# Patient Record
Sex: MALE | Race: ASIAN | URBAN - METROPOLITAN AREA
[De-identification: names, ages, dates, MRNs, and addresses within clinical notes are randomized per-mention and may not be internally consistent; named-entity substitution may affect disease eponyms.]

---

## 2017-01-08 DIAGNOSIS — Z02.89 HEALTH EXAMINATION OF DEFINED SUBPOPULATION: ICD-10-CM

## 2017-01-08 NOTE — Clinical Note
February 6, 2017      Marcbrooklynn López Paw  [address]   [place] [address]        Dear Marc,    The results of your stool cultures came back and showed that you have some overgrowth of bacteria. We typically do not treat this but would consider more testing in the future if you begin to have symptoms of; diarrhea, blood or mucus in your stools, or abdominal pain. If you have questions please contact clinic at [phone]. Thank you for allowing us to participate in your care.    Please see below for your test results.    Resulted Orders   Ova And Parasite - Stool ([facility])   Result Value Ref Range    Ova and Parasite Entamoeba coli Cysts (A) No Ova or Parasites seen    Ova + Parasite Additional 1 Endolimax ken cysts (A) No Ova or Parasites seen    Narrative    Test performed by:  [facility]  [address] [place] [address]       If you have any questions, please call the clinic to make an appointment.    Sincerely,    Dr. Sands

## 2017-01-09 ENCOUNTER — OFFICE VISIT (OUTPATIENT)
Dept: FAMILY MEDICINE | Facility: CLINIC | Age: 27
End: 2017-01-09

## 2017-01-09 VITALS
HEART RATE: 90 BPM | SYSTOLIC BLOOD PRESSURE: 124 MMHG | WEIGHT: 109.8 LBS | TEMPERATURE: 97.5 F | BODY MASS INDEX: 18.29 KG/M2 | HEIGHT: 65 IN | DIASTOLIC BLOOD PRESSURE: 79 MMHG

## 2017-01-09 DIAGNOSIS — Z23 NEED FOR VACCINATION: ICD-10-CM

## 2017-01-09 DIAGNOSIS — Z02.89 HEALTH EXAMINATION OF DEFINED SUBPOPULATION: ICD-10-CM

## 2017-01-09 DIAGNOSIS — B65.9 SCHISTOSOMA: Primary | ICD-10-CM

## 2017-01-09 PROBLEM — B18.1 HEPATITIS B CARRIER (H): Status: ACTIVE | Noted: 2017-01-09

## 2017-01-09 RX ORDER — PRAZIQUANTEL 600 MG/1
1000 TABLET, FILM COATED ORAL 3 TIMES DAILY
Qty: 5 TABLET | Refills: 0 | Status: SHIPPED | OUTPATIENT
Start: 2017-01-09 | End: 2017-01-10

## 2017-01-09 NOTE — PATIENT INSTRUCTIONS
Stop by the  for referral to [facility] for TB referral     Please schedule an appointment for a dentist to look at your mouth an area of growth along the upper left molars    Praziquantel 900 mg three times per day for 1 day     Thank you for coming to [facility].  **If you had lab testing today and your results are reassuring or normal they will be be mailed to you within 7 days.   **If the lab tests need quick action we will call you with the results.  The phone number we will call with results is # [phone] (home) . If this is not the best number please call our clinic and change the number.  If you need any refills please call your pharmacy and they will contact us.  If you have any concerns about today's visit or wish to schedule another appointment please call our office during normal business hours [phone] (8-5:00 M-F)  If you have urgent medical concerns please call [phone] at any time of the day.  If you a medical emergency please call [phone]  Again thank you for choosing [facility] and please let us know how we can best partner with you to improve you and your family's health.

## 2017-01-09 NOTE — PROGRESS NOTES
"Preceptor attestation:  Vital signs reviewed: /79 mmHg  Pulse 90  Temp(Src) 97.5  F (36.4  C) (Oral)  Ht 5' 4.5\" (163.8 cm)  Wt 109 lb 12.8 oz (49.805 kg)  BMI 18.56 kg/m2    Patient seen and discussed with the resident.  Assessment and plan reviewed with resident and agreed upon.    Supervising physician: Ana Luisa Ramirez  [facility]  "

## 2017-01-09 NOTE — Clinical Note
February 6, 2017        Marcbrooklynn López Paw  [address]   [place] [address]        Dear Marc,    The results of your stool cultures came back and showed that you have some overgrowth of bacteria. We typically do not treat this but would consider more testing in the future if you begin to have symptoms of diarrhea, blood or mucus in your stools, or abdominal pain. If you have questions please contact clinic at [phone]. Thank you for allowing us to participate in your care.      Please see below for your test results.    Resulted Orders   Ova And Parasite - Stool ([facility])   Result Value Ref Range    Ova and Parasite Entamoeba coli Cysts (A) No Ova or Parasites seen    Ova + Parasite Additional 1 Entamoeba coli trophozoites (A) No Ova or Parasites seen    Narrative    Test performed by:  [facility]  [address] [place] [address]       If you have any questions, please call the clinic to make an appointment.    Sincerely,    Mo Sands, DO

## 2017-01-09 NOTE — MR AVS SNAPSHOT
After Visit Summary   1/9/2017    Marc Maria    MRN: [id number]           Patient Information     Date Of Birth          1990        Visit Information        Provider Department      1/9/2017 8:00 AM Mo Sands DO [facility]        Today's Diagnoses     Schistosoma    -  1       Care Instructions    Stop by the  for referral to [facility] for TB referral     Please schedule an appointment for a dentist to look at your mouth an area of growth along the upper left molars    Praziquantel 900 mg three times per day for 1 day     Thank you for coming to [facility].  **If you had lab testing today and your results are reassuring or normal they will be be mailed to you within 7 days.   **If the lab tests need quick action we will call you with the results.  The phone number we will call with results is # [phone] (home) . If this is not the best number please call our clinic and change the number.  If you need any refills please call your pharmacy and they will contact us.  If you have any concerns about today's visit or wish to schedule another appointment please call our office during normal business hours [phone] (8-5:00 M-F)  If you have urgent medical concerns please call [phone] at any time of the day.  If you a medical emergency please call [phone]  Again thank you for choosing [facility] and please let us know how we can best partner with you to improve you and your family's health.            Follow-ups after your visit        Who to contact     Please call your clinic at [phone] to:    Ask questions about your health    Make or cancel appointments    Discuss your medicines    Learn about your test results    Speak to your doctor   If you have compliments or concerns about an experience at your clinic, or if you wish to file a complaint, please contact [facility] Patient Relations at [phone] or email  "us at [email or web address]         Additional Information About Your Visit        Submittablehart Information     [employer] is an electronic gateway that provides easy, online access to your medical records. With [email or web address], you can request a clinic appointment, read your test results, renew a prescription or communicate with your care team.     To sign up for [email or web address] visit the website at [email or web address]   You will be asked to enter the access code listed below, as well as some personal information. Please follow the directions to create your username and password.     Your access code is: [email or web address]  Expires: 3/26/2017  9:06 AM     Your access code will  in 90 days. If you need help or a new code, please contact your [facility] or call [phone] for assistance.        Care EveryWhere ID     This is your Care EveryWhere ID. This could be used by other organizations to access your [place] medical records  [id number]        Your Vitals Were     Pulse Temperature Height BMI (Body Mass Index)          90 97.5  F (36.4  C) (Oral) 5' 4.5\" (163.8 cm) 18.56 kg/m2         Blood Pressure from Last 3 Encounters:   17 124/79   16 136/83    Weight from Last 3 Encounters:   17 109 lb 12.8 oz (49.805 kg)   16 112 lb 6.4 oz (50.984 kg)              Today, you had the following     No orders found for display         Today's Medication Changes          These changes are accurate as of: 17  8:44 AM.  If you have any questions, ask your nurse or doctor.               Start taking these medicines.        Dose/Directions    praziquantel 600 MG tablet   Commonly known as:  BILTRICIDE   Used for:  Schistosoma   Started by:  Mo Sands DO        Dose:  1000 mg   Take 1.5 tablets (900 mg) by mouth 3 times daily for 1 day   Quantity:  5 tablet   Refills:  0            Where to get your medicines      These medications were sent to [facility] " [facility] [place], [place] - [address]   [address], [place] [address]     Phone:  [phone]    - praziquantel 600 MG tablet             Primary Care Provider Office Phone # Fax #    Mo Sands [phone]       [facility] [address] [address]        Thank you!     Thank you for choosing [facility]  for your care. Our goal is always to provide you with excellent care. Hearing back from our patients is one way we can continue to improve our services. Please take a few minutes to complete the written survey that you may receive in the mail after your visit with us. Thank you!             Your Updated Medication List - Protect others around you: Learn how to safely use, store and throw away your medicines at www.disposemymeds.org.          This list is accurate as of: 1/9/17  8:44 AM.  Always use your most recent med list.                   Brand Name Dispense Instructions for use    praziquantel 600 MG tablet    BILTRICIDE    5 tablet    Take 1.5 tablets (900 mg) by mouth 3 times daily for 1 day

## 2017-01-09 NOTE — PROGRESS NOTES
REFUGEE SCREENING: SECOND VISIT    Subjective:     Presents today in review of his lab tests. He has not concerns or questions today. States that he is otherwise doing well.     Labs from Initial Refugee Screening Visit were reviewed       Office Visit on 12/26/2016   Component Date Value Ref Range Status     Sodium 12/26/2016 140  136 - 145 mmol/L Final     Potassium 12/26/2016 3.8  3.5 - 5.0 mmol/L Final     Chloride 12/26/2016 105  98 - 107 mmol/L Final     CO2, Total 12/26/2016 25  22 - 31 mmol/L Final     Anion Gap 12/26/2016 10  5 - 18 mmol/L Final     Glucose 12/26/2016 82  70 - 125 mg/dL Final     Urea Nitrogen 12/26/2016 12  8 - 22 mg/dL Final     Creatinine 12/26/2016 1.05  0.70 - 1.30 mg/dL Final     GFR Estimate If Black 12/26/2016 >60  >60 mL/min/1.73m2 Final     GFR Estimate 12/26/2016 >60  >60 mL/min/1.73m2 Final     Bilirubin Total 12/26/2016 0.9  0.0 - 1.0 mg/dL Final     Calcium 12/26/2016 9.2  8.5 - 10.5 mg/dL Final     Protein Total 12/26/2016 7.0  6.0 - 8.0 g/dL Final     Albumin 12/26/2016 4.0  3.5 - 5.0 g/dL Final     Alkaline Phosphatase 12/26/2016 57  45 - 120 U/L Final     AST (SGOT) 12/26/2016 20  0 - 40 U/L Final     ALT (SGPT) 12/26/2016 20  0 - 45 U/L Final     Cholesterol 12/26/2016 156  <=199 mg/dL Final     Triglycerides 12/26/2016 140  <=149 mg/dL Final     HDL Cholesterol 12/26/2016 45  >=40 mg/dL Final     LDL Cholesterol Calculated 12/26/2016 83  <=129 mg/dL Final     Fasting? 12/26/2016 Unknown   Final     WBC 12/26/2016 4.5  4.0 - 11.0 thou/uL Final     RBC 12/26/2016 5.41  4.40 - 6.20 mill/uL Final     Hemoglobin 12/26/2016 15.6  14.0 - 18.0 g/dL Final     Hematocrit 12/26/2016 47.2  40.0 - 54.0 % Final     MCV 12/26/2016 87  80 - 100 fL Final     MCH 12/26/2016 28.8  27.0 - 34.0 pg Final     MCHC 12/26/2016 33.1  32.0 - 36.0 g/dL Final     RDW 12/26/2016 12.5  11.0 - 14.5 % Final     Platelets 12/26/2016 129* 140 - 440 thou/uL Final     Mean Platelet Volume 12/26/2016 12.3   8.5 - 12.5 fL Final     % Neutrophils 12/26/2016 59  50 - 70 % Final     % Lymphocytes 12/26/2016 32  20 - 40 % Final     % Monocytes 12/26/2016 6  2 - 10 % Final     % Eosinophils 12/26/2016 3  0 - 6 % Final     % Basophils 12/26/2016 1  0 - 2 % Final     Neutrophils (Absolute) 12/26/2016 2.7  2.0 - 7.7 thou/uL Final     Lymphs (Absolute) 12/26/2016 1.5  0.8 - 4.4 thou/uL Final     Monocytes(Absolute) 12/26/2016 0.3  0.0 - 0.9 thou/uL Final     Eos (Absolute) 12/26/2016 0.1  0.0 - 0.4 thou/uL Final     Baso (Absolute) 12/26/2016 0.0  0.0 - 0.2 thou/uL Final     MTB Quant Result 12/26/2016 Positive* Negative Final     M Tuberculosis Antigen Value 12/26/2016 2.73   Final     MTB Quant Interp 12/26/2016 SEE RESULTS BELOW   Final    Comment: The magnitude of the measured IFN-gamma level          cannot be correlated to stage or degree of infection,  level of immune responsiveness, or likelihood for      progression to active disease.       Scan Result 12/26/2016 See Scanned Report   Final    Comment:     This is a qualitative test. The TB antigen IU/mL          value is required for documentation on certain         government reporting forms but this value should       not be used to monitor disease progression or          response to therapy.                                Diagnosing or excluding tuberculosis disease, and         assessing the probability of LTBI, require a           combination of epidemiological, historical,            medical and diagnostic findings that should be      taken into account when interpreting       QuantiFERON-TB results.                      Performed by: [facility]                 [facility]                       [facility] [id number]                         [address] [place] [id number]        Syphilis Screen Cascade 12/26/2016 Non-Reactive  Non-Reactive Final     Strongyloides Ab, IgG, S 12/26/2016 Negative  Negative  Final    Comment: No detectable levels of IgG antibodies to Strongyloides.  Repeat testing in 1-2 weeks if clinically indicated.     Test Performed by:  [facility] [place] [address]  : Abe Marte II, M.D., Ph.D.       Schistosoma Ab, IgG, S 12/26/2016 Equivocal*  Final    Comment: Recommend follow-up testing in 10-14 days if clinically  indicated.     -------------------ADDITIONAL INFORMATION-------------------  This test has been modified from the 's   instructions. Its performance characteristics were   determined by [facility] in a manner consistent with   CLIA requirements. This test has not been cleared or   approved by the U.S. Food and Drug Administration.     Test Performed by:  [facility] [place] [address]  : Abe Marte II, M.D., Ph.D.       HIV Antigen/Antibody 12/26/2016 Negative  Negative Final     Hepatitis A Antibody, Total 12/26/2016 Positive  Positive Final     Hepatitis B Core Antibody 12/26/2016 Positive* Negative Final     Hepatitis B Surface Antibody 12/26/2016 Positive* Negative Final     Hepatitis C Antibody Screen 12/26/2016 Negative  Negative Final     V.zoster Immune Status 12/26/2016 Immune  Immune Final     Hepatitis B Surface Antigen 12/26/2016 Negative  Negative Final     Chlamydia trac,Amplified Prb 12/26/2016 Negative  Negative Final     N gonorrhoeae,Amplified Prb 12/26/2016 Negative  Negative Final     Vitamin B12 12/26/2016 502  213 - 816 pg/mL Final        ROS:  General: No fevers, sleeping well at night  Head: No headache  Neck: No swallowing problems   CV: No chest pain or palpitations  Resp: No shortness of breath.  No cough.  GI: No constipation, or diarrhea, no nausea or vomiting  : No urinary c/o    Objective:  There were no vitals taken for this visit.  Gen:  Well nourished and  in NAD  HEENT: nasopharynx pink and moist; oropharynx pink and moist. Growth along left upper jawline. Non-painful to exam and no fluctuance.   Neck: supple without lymphadenopathy  CV:  RRR  - no murmurs, rubs, or gallups,   Pulm:  CTAB, no wheezes/rales/rhonchi, good air entry   ABD: soft, nontender  Extrem: no cyanosis, edema or clubbing;   Psych: Euthymic     Assessment/Plan:  There are no diagnoses linked to this encounter.    1)Abnormal Lab Results:  Hepatitis B core Ab positive, Hepatitis B surface Ab positive, Hepatitis B surface Ag negative - Past Hepatitis B infection   Hepatitis A Ab positive - Past Hepatitis A infection   Schistosoma - Equivocal (Will treat today with praziquantel TID for 1 day)      2) TB:   TB Quant result: Positive   History of active TB with [place] refugee [place]. Will send him to be seen by [facility] TB clinic     3)Immunizations:  TDaP  Influenza   Yearly  HPV    4)Referrals:  [facility]    Dentist for growth along left upper jawline.       5)Follow up Plan:   Return to clinic for annual physical in 1 year and check back after being seen by a Dentist.     Patient was seen and discussed with Dr. James Sands

## 2017-01-09 NOTE — NURSING NOTE
Marc Sheh Paw      1.  Has the patient received the information for the influenza vaccine? YES    2.  Does the patient have any of the following contraindications?     Allergy to eggs? No     Allergic reaction to previous influenza vaccines? No     Any other problems to previous influenza vaccines? No     Paralyzed by Guillain-Erwinna syndrome? No     Currently pregnant? NO     Current moderate or severe illness? No    Vaccination given by Ana Luisa Richardson CMA  Recorded by Ana Luisa Richardson CMA

## 2017-01-10 LAB
O+P SPEC MICRO: ABNORMAL
O+P SPEC MICRO: ABNORMAL
OVA + PARASITE ADDITIONAL 1: ABNORMAL
OVA + PARASITE ADDITIONAL 1: ABNORMAL

## 2017-01-30 ENCOUNTER — TRANSFERRED RECORDS (OUTPATIENT)
Dept: HEALTH INFORMATION MANAGEMENT | Facility: CLINIC | Age: 27
End: 2017-01-30

## 2017-02-06 NOTE — PROGRESS NOTES
Quick Note:    Please send letter to patient    Marc Maria the results of your stool cultures came back and showed that you have some overgrowth of bacteria. We typically do not treat this but would consider more testing in the future if you begin to have symptoms of; diarrhea, blood or mucus in your stools, or abdominal pain. If you have questions please contact clinic at [phone]. Thank you for allowing us to participate in your care.     Sincerely,   Ventura Sands  ______

## 2017-02-06 NOTE — PROGRESS NOTES
Quick Note:    Please send letter to patient    Marc Maria the results of your stool cultures came back and showed that you have some overgrowth of bacteria. We typically do not treat this but would consider more testing in the future if you begin to have symptoms of; diarrhea, blood or mucus in your stools, or abdominal pain. If you have questions please contact clinic at [phone]. Thank you for allowing us to participate in your care.     Sincerely,   Ventura Sands  ______

## 2017-02-09 ENCOUNTER — ALLIED HEALTH/NURSE VISIT (OUTPATIENT)
Dept: FAMILY MEDICINE | Facility: CLINIC | Age: 27
End: 2017-02-09

## 2017-02-09 VITALS
DIASTOLIC BLOOD PRESSURE: 73 MMHG | TEMPERATURE: 98.1 F | BODY MASS INDEX: 19.61 KG/M2 | WEIGHT: 116 LBS | HEART RATE: 79 BPM | SYSTOLIC BLOOD PRESSURE: 131 MMHG

## 2017-02-09 DIAGNOSIS — Z23 NEED FOR VACCINATION: Primary | ICD-10-CM

## 2017-02-09 NOTE — NURSING NOTE
Patient is here for #2 HPV   name: Jonah Ferreira  Language: Camelia  Agency: [employer]  Phone number: [phone]    Return to clinic in 4 months for #3 HPV

## 2017-06-09 ENCOUNTER — OFFICE VISIT (OUTPATIENT)
Dept: FAMILY MEDICINE | Facility: CLINIC | Age: 27
End: 2017-06-09

## 2017-06-09 VITALS
SYSTOLIC BLOOD PRESSURE: 129 MMHG | DIASTOLIC BLOOD PRESSURE: 83 MMHG | OXYGEN SATURATION: 98 % | HEART RATE: 87 BPM | HEIGHT: 64 IN | WEIGHT: 112 LBS | BODY MASS INDEX: 19.12 KG/M2 | TEMPERATURE: 98.5 F

## 2017-06-09 DIAGNOSIS — R10.13 EPIGASTRIC PAIN: Primary | ICD-10-CM

## 2017-06-09 DIAGNOSIS — R11.0 NAUSEA: ICD-10-CM

## 2017-06-09 NOTE — PROGRESS NOTES
Preceptor attestation:  Patient seen and discussed with the resident.  Assessment and plan reviewed with resident and agreed upon.  Supervising physician: Carlie Hutton MD  [facility]

## 2017-06-09 NOTE — PROGRESS NOTES
"Subjective:  Marc Maria is a 27 year old male with a history of     Patient Active Problem List    Diagnosis Date Noted     Hepatitis B carrier 01/09/2017     Priority: Medium     Schistosoma 01/09/2017     Priority: Medium     Treated on 1/9/17       Health examination of defined subpopulation 12/26/2016     Priority: Medium     Initial Exam on 12/26/16       TB (pulmonary tuberculosis) 12/26/2016     Priority: Medium     [place]. Negative sputum x3 in 2016. Referral to [facility].   [facility] - No further follow-up in TB clinic       Who presents today with concerns of nausea that has been going on for the last 3 weeks.  States that he has had episodes similar to this in the past most recently when he is a refugee camp.  States that at that time his symptoms completely resolved without further treatment.  Associated symptoms include the sensation that he has a burning sense in his stomach when he does not eat food for extended period of time.  Denies any episodes of vomiting.  Denies any changes in his bowel habits.  Typically has 1 or 2 stools per day which are described as nonbloody and non-dark.  No associated weight loss.  No fevers or chills.    ROS: No chest pain or chest pressure.  No cough or shortness of breath.     Objective:  Vitals: /83  Pulse 87  Temp 98.5  F (36.9  C) (Oral)  Ht 5' 4.25\" (163.2 cm)  Wt 112 lb (50.8 kg)  SpO2 98%  BMI 19.08 kg/m2  General: Well-nourished. Alert and cooperative. No apparent distress.  HEENT: Normocephalic and atraumatic head. No posterior oropharynx erythema.   Cardiovascular: Regular rate and rhythm. Normal S1 and S2. No murmurs  Respiratory: Clear to auscultation bilaterally.   Gastrointestinal: Abdomen soft and non-tender.  Normoactive bowel sounds.  No masses.  No hepatosplenomegaly.  No rigidity, distension, or guarding.      Assessment:  Marc Maria is a 27 year old male seen " today with nausea.       Plan:      Epigastric pain: Will start him on ranitidine and in the meantime order an H. pylori stool test.  We will have him follow-up in 1-2 weeks or sooner if new concerns arise to recheck his symptoms.  -     H. Pylori Agn Fecal ([employer]); Future  -     ranitidine (ZANTAC) 150 MG tablet; Take 1 tablet (150 mg) by mouth 2 times daily    Nausea: Will start him on ranitidine and in the meantime order an H. pylori stool test.  We will have him follow-up in 1-2 weeks or sooner if new concerns arise to recheck his symptoms.  -     H. Pylori Agn Fecal ([employer]); Future  -     ranitidine (ZANTAC) 150 MG tablet; Take 1 tablet (150 mg) by mouth 2 times daily    Patient was discussed with and seen by Dr. Hutton.    Mo Sands PGY3

## 2017-06-09 NOTE — PATIENT INSTRUCTIONS
Stop by lab before you leave     Start taking ranitidine two times daily   Follow-up in 3-4 weeks or sooner if new concerns arise.     Thank you for coming to [facility].  **If you had lab testing today and your results are reassuring or normal they will be be mailed to you within 7 days.   **If the lab tests need quick action we will call you with the results.  The phone number we will call with results is # [phone] (home) . If this is not the best number please call our clinic and change the number.  If you need any refills please call your pharmacy and they will contact us.  If you have any concerns about today's visit or wish to schedule another appointment please call our office during normal business hours [phone] (8-5:00 M-F)  If you have urgent medical concerns please call [phone] at any time of the day.  If you a medical emergency please call [phone]  Again thank you for choosing [facility] and please let us know how we can best partner with you to improve you and your family's health.

## 2017-06-09 NOTE — MR AVS SNAPSHOT
After Visit Summary   6/9/2017    Marc Maria    MRN: [id number]           Patient Information     Date Of Birth          1990        Visit Information        Provider Department      6/9/2017 4:30 PM Mo Sands DO [facility]        Today's Diagnoses     Epigastric pain    -  1    Nausea          Care Instructions    Stop by lab before you leave     Start taking ranitidine two times daily   Follow-up in 3-4 weeks or sooner if new concerns arise.     Thank you for coming to [facility].  **If you had lab testing today and your results are reassuring or normal they will be be mailed to you within 7 days.   **If the lab tests need quick action we will call you with the results.  The phone number we will call with results is # [phone] (home) . If this is not the best number please call our clinic and change the number.  If you need any refills please call your pharmacy and they will contact us.  If you have any concerns about today's visit or wish to schedule another appointment please call our office during normal business hours [phone] (8-5:00 M-F)  If you have urgent medical concerns please call [phone] at any time of the day.  If you a medical emergency please call [phone]  Again thank you for choosing [facility] and please let us know how we can best partner with you to improve you and your family's health.              Follow-ups after your visit        Future tests that were ordered for you today     Open Future Orders        Priority Expected Expires Ordered    H. Pylori Agn Fecal ([facility]) Routine  6/16/2017 6/9/2017            Who to contact     Please call your clinic at [phone] to:    Ask questions about your health    Make or cancel appointments    Discuss your medicines    Learn about your test results    Speak to your doctor   If you have compliments or concerns about an experience at your clinic, or if you wish to file a complaint, please  "contact [facility] Patient Relations at [phone] or email us at [email or web address]         Additional Information About Your Visit        [employer] Information     [employer] is an electronic gateway that provides easy, online access to your medical records. With [employer], you can request a clinic appointment, read your test results, renew a prescription or communicate with your care team.     To sign up for [employer] visit the website at [email or web address]   You will be asked to enter the access code listed below, as well as some personal information. Please follow the directions to create your username and password.     Your access code is: [email or web address]  Expires: 2017  5:01 PM     Your access code will  in 90 days. If you need help or a new code, please contact your [facility] or call [phone] for assistance.        Care EveryWhere ID     This is your Care EveryWhere ID. This could be used by other organizations to access your [place] medical records  [id number]        Your Vitals Were     Pulse Temperature Height Pulse Oximetry BMI (Body Mass Index)       87 98.5  F (36.9  C) (Oral) 5' 4.25\" (163.2 cm) 98% 19.08 kg/m2        Blood Pressure from Last 3 Encounters:   17 129/83   17 131/73   17 124/79    Weight from Last 3 Encounters:   17 112 lb (50.8 kg)   17 116 lb (52.6 kg)   17 109 lb 12.8 oz (49.8 kg)                 Today's Medication Changes          These changes are accurate as of: 17  5:01 PM.  If you have any questions, ask your nurse or doctor.               Start taking these medicines.        Dose/Directions    ranitidine 150 MG tablet   Commonly known as:  ZANTAC   Used for:  Nausea, Epigastric pain   Started by:  Mo Sands DO        Dose:  150 mg   Take 1 tablet (150 mg) by mouth 2 times daily   Quantity:  60 tablet   Refills:  0            Where to get your " medicines      These medications were sent to [facility] [place], [place] - [address] [facility] [address], [place] [address]     Phone:  [phone]     ranitidine 150 MG tablet                Primary Care Provider Office Phone # Fax #    Mo Sands [phone]       [facility] [address] [facility] [address]        Thank you!     Thank you for choosing [facility]  for your care. Our goal is always to provide you with excellent care. Hearing back from our patients is one way we can continue to improve our services. Please take a few minutes to complete the written survey that you may receive in the mail after your visit with us. Thank you!             Your Updated Medication List - Protect others around you: Learn how to safely use, store and throw away your medicines at www.disposemymeds.org.          This list is accurate as of: 6/9/17  5:01 PM.  Always use your most recent med list.                   Brand Name Dispense Instructions for use    ranitidine 150 MG tablet    ZANTAC    60 tablet    Take 1 tablet (150 mg) by mouth 2 times daily

## 2017-06-14 LAB — H PYLORI AG STL QL IA: POSITIVE

## 2017-06-16 DIAGNOSIS — A04.8 H. PYLORI INFECTION: Primary | ICD-10-CM

## 2017-06-16 RX ORDER — AMOXICILLIN 500 MG/1
1000 CAPSULE ORAL 2 TIMES DAILY
Qty: 56 CAPSULE | Refills: 0 | Status: SHIPPED | OUTPATIENT
Start: 2017-06-16 | End: 2017-06-30

## 2017-06-16 RX ORDER — CLARITHROMYCIN 500 MG
500 TABLET ORAL 2 TIMES DAILY
Qty: 28 TABLET | Refills: 0 | Status: SHIPPED | OUTPATIENT
Start: 2017-06-16 | End: 2017-06-30

## 2017-06-20 ENCOUNTER — OFFICE VISIT (OUTPATIENT)
Dept: FAMILY MEDICINE | Facility: CLINIC | Age: 27
End: 2017-06-20

## 2017-06-20 VITALS
SYSTOLIC BLOOD PRESSURE: 129 MMHG | WEIGHT: 111.2 LBS | HEART RATE: 80 BPM | BODY MASS INDEX: 18.98 KG/M2 | TEMPERATURE: 97.8 F | DIASTOLIC BLOOD PRESSURE: 82 MMHG | HEIGHT: 64 IN

## 2017-06-20 DIAGNOSIS — A04.8 H. PYLORI INFECTION: Primary | ICD-10-CM

## 2017-06-20 NOTE — MR AVS SNAPSHOT
After Visit Summary   6/20/2017    Marc Maria    MRN: [id number]           Patient Information     Date Of Birth          1990        Visit Information        Provider Department      6/20/2017 9:00 AM Mo Sands DO [facility]        Today's Diagnoses     H. pylori infection    -  1      Care Instructions    Continue on H. Pylori Treatment  - Omeprazole 20 mg two times daily for 14 days  - Amoxicillin 1000 mg two times daily for 14 days  - Clarithromycin 500 mg two times daily for 14 days    Follow-up 2 weeks after completing medication if appetite has not improved.     Thank you for coming to [facility].  **If you had lab testing today and your results are reassuring or normal they will be be mailed to you within 7 days.   **If the lab tests need quick action we will call you with the results.  The phone number we will call with results is # [phone] (home) . If this is not the best number please call our clinic and change the number.  If you need any refills please call your pharmacy and they will contact us.  If you have any concerns about today's visit or wish to schedule another appointment please call our office during normal business hours [phone] (8-5:00 M-F)  If you have urgent medical concerns please call [phone] at any time of the day.  If you a medical emergency please call [phone]  Again thank you for choosing [facility] and please let us know how we can best partner with you to improve you and your family's health.              Follow-ups after your visit        Who to contact     Please call your clinic at [phone] to:    Ask questions about your health    Make or cancel appointments    Discuss your medicines    Learn about your test results    Speak to your doctor   If you have compliments or concerns about an experience at your clinic, or if you wish to file a complaint, please contact [facility] Patient Relations at  "[phone] or email us at [email or web address]         Additional Information About Your Visit        [employer] Information     [employer] is an electronic gateway that provides easy, online access to your medical records. With [employer], you can request a clinic appointment, read your test results, renew a prescription or communicate with your care team.     To sign up for [employer] visit the website at [email or web address]   You will be asked to enter the access code listed below, as well as some personal information. Please follow the directions to create your username and password.     Your access code is: [email or web address]  Expires: 2017  5:01 PM     Your access code will  in 90 days. If you need help or a new code, please contact your [facility] or call [phone] for assistance.        Care EveryWhere ID     This is your Care EveryWhere ID. This could be used by other organizations to access your [place] medical records  [id number]        Your Vitals Were     Pulse Temperature Height BMI (Body Mass Index)          80 97.8  F (36.6  C) (Oral) 5' 4.25\" (163.2 cm) 18.94 kg/m2         Blood Pressure from Last 3 Encounters:   17 129/82   17 129/83   17 131/73    Weight from Last 3 Encounters:   17 111 lb 3.2 oz (50.4 kg)   17 112 lb (50.8 kg)   17 116 lb (52.6 kg)              Today, you had the following     No orders found for display         Today's Medication Changes          These changes are accurate as of: 17  9:41 AM.  If you have any questions, ask your nurse or doctor.               Stop taking these medicines if you haven't already. Please contact your care team if you have questions.     ranitidine 150 MG tablet   Commonly known as:  ZANTAC   Stopped by:  Mo Sands DO                    Primary Care Provider Office Phone # Fax #    Mo Sands [phone]       [facility] " [facility] [address] [facility] [address]        Thank you!     Thank you for choosing [facility]  for your care. Our goal is always to provide you with excellent care. Hearing back from our patients is one way we can continue to improve our services. Please take a few minutes to complete the written survey that you may receive in the mail after your visit with us. Thank you!             Your Updated Medication List - Protect others around you: Learn how to safely use, store and throw away your medicines at www.disposemymeds.org.          This list is accurate as of: 6/20/17  9:41 AM.  Always use your most recent med list.                   Brand Name Dispense Instructions for use    amoxicillin 500 MG capsule    AMOXIL    56 capsule    Take 2 capsules (1,000 mg) by mouth 2 times daily for 14 days       clarithromycin 500 MG tablet    BIAXIN    28 tablet    Take 1 tablet (500 mg) by mouth 2 times daily for 14 days       omeprazole 20 MG CR capsule    priLOSEC    28 capsule    Take 1 capsule (20 mg) by mouth 2 times daily for 14 days Take 30-60 minutes before a meal.

## 2017-06-20 NOTE — PATIENT INSTRUCTIONS
Continue on H. Pylori Treatment  - Omeprazole 20 mg two times daily for 14 days  - Amoxicillin 1000 mg two times daily for 14 days  - Clarithromycin 500 mg two times daily for 14 days    Follow-up 2 weeks after completing medication if appetite has not improved.     Thank you for coming to [facility].  **If you had lab testing today and your results are reassuring or normal they will be be mailed to you within 7 days.   **If the lab tests need quick action we will call you with the results.  The phone number we will call with results is # [phone] (home) . If this is not the best number please call our clinic and change the number.  If you need any refills please call your pharmacy and they will contact us.  If you have any concerns about today's visit or wish to schedule another appointment please call our office during normal business hours [phone] (8-5:00 M-F)  If you have urgent medical concerns please call [phone] at any time of the day.  If you a medical emergency please call [phone]  Again thank you for choosing [facility] and please let us know how we can best partner with you to improve you and your family's health.

## 2017-06-20 NOTE — PROGRESS NOTES
Preceptor attestation:  Patient seen and discussed with the resident. Assessment and plan reviewed with resident and agreed upon.  Supervising physician: Chon Marcos MD  [facility]

## 2017-06-20 NOTE — PROGRESS NOTES
"Subjective:  Marc Maria is a 27 year old male with a history of    Patient Active Problem List    Diagnosis Date Noted     Hepatitis B carrier 01/09/2017     Priority: Medium     Schistosoma 01/09/2017     Priority: Medium     Treated on 1/9/17       Health examination of defined subpopulation 12/26/2016     Priority: Medium     Initial Exam on 12/26/16       TB (pulmonary tuberculosis) 12/26/2016     Priority: Medium     [place] Negative sputum x3 in 2016. Referral to [facility] TB clinic.   [facility] - No further follow-up in TB clinic       Who presents today in follow-up of his appetite. States that his appetite is unchanged since the last time he was seen in clinic. States that he will have 1-2 meals per day. No longer has an appetite in the morning which he used to have. Foods that he is currently eating include rice, currary, or soup. Has currently on triple therapy for his positive H. Pylori test. Denies missing any of the dosages. Started therapy on 6/16/17. Does states that the nausea is a little better.     Does have a burning sensation in his chest which is made worse when he doesn't have food in his stomach. No bad taste in the back of his throat usually in the morning.     Did have issues with appetite concerns while in his [place] which would resolve on their own.     ROS: No fevers or chills. No sore throat. No cough or shortness of breath.     Objective:  Vitals: /82  Pulse 80  Temp 97.8  F (36.6  C) (Oral)  Ht 5' 4.25\" (163.2 cm)  Wt 111 lb 3.2 oz (50.4 kg)  BMI 18.94 kg/m2  General: Well-nourished. Alert and cooperative. No apparent distress.  Cardiovascular: Regular rate and rhythm. Normal S1 and S2. No murmurs  Respiratory: Clear to auscultation bilaterally.   Gastrointestinal: Abdomen soft and non-tender.  Normoactive bowel sounds.  No masses.  No hepatosplenomegaly.  No rigidity, distension, or guarding.  Skin: Previous " scar along back from surgery in [place] that is well healed.      Assessment:  Marc Maria is a 27 year old male seen today in follow-up    Plan:    H. pylori infection: Abdominal burning improved after beginning his treatment. Reinforced the importance of continuing with this course of medication.  Will hold off on further evaluation of his appetite at this time based upon his stable weight until he has competed his H.Pylori therapy.     Patient was discussed with and seen by Dr. Marcos.    Mo Sands PGY3

## 2017-09-25 ENCOUNTER — RECORDS - HEALTHEAST (OUTPATIENT)
Dept: ADMINISTRATIVE | Facility: OTHER | Age: 27
End: 2017-09-25

## 2017-09-25 ENCOUNTER — OFFICE VISIT (OUTPATIENT)
Dept: FAMILY MEDICINE | Facility: CLINIC | Age: 27
End: 2017-09-25

## 2017-09-25 VITALS
HEART RATE: 84 BPM | HEIGHT: 64 IN | BODY MASS INDEX: 18.65 KG/M2 | SYSTOLIC BLOOD PRESSURE: 138 MMHG | DIASTOLIC BLOOD PRESSURE: 88 MMHG | TEMPERATURE: 98.2 F | WEIGHT: 109.25 LBS | OXYGEN SATURATION: 99 %

## 2017-09-25 DIAGNOSIS — R10.13 ABDOMINAL PAIN, EPIGASTRIC: Primary | ICD-10-CM

## 2017-09-25 LAB
% GRANULOCYTES: 76.9 %G (ref 40–75)
ALBUMIN SERPL-MCNC: 4.7 MG/DL (ref 3.9–5.1)
ALP SERPL-CCNC: 60.2 U/L (ref 40–150)
ALT SERPL-CCNC: 25 U/L (ref 0–45)
AST SERPL-CCNC: 28.6 U/L (ref 0–55)
BILIRUB SERPL-MCNC: 0.7 MG/DL (ref 0.2–1.3)
BILIRUBIN UR: NEGATIVE
BLOOD UR: NEGATIVE
BUN SERPL-MCNC: 9.9 MG/DL (ref 7–21)
CALCIUM SERPL-MCNC: 10.3 MG/DL (ref 8.5–10.1)
CHLORIDE SERPLBLD-SCNC: 100.6 MMOL/L (ref 98–110)
CO2 SERPL-SCNC: 28.6 MMOL/L (ref 20–32)
CREAT SERPL-MCNC: 0.9 MG/DL (ref 0.7–1.3)
GFR SERPL CREATININE-BSD FRML MDRD: >90 ML/MIN/1.7 M2
GLUCOSE SERPL-MCNC: 131.5 MG'DL (ref 70–99)
GLUCOSE URINE: NEGATIVE
GRANULOCYTES #: 6 K/UL (ref 1.6–8.3)
HCT VFR BLD AUTO: 52.2 % (ref 40–53)
HEMOGLOBIN: 16.2 G/DL (ref 13.3–17.7)
KETONES UR QL: NEGATIVE
LEUKOCYTE ESTERASE UR: NEGATIVE
LIPASE SERPL-CCNC: 15 U/L (ref 0–52)
LYMPHOCYTES # BLD AUTO: 1.5 K/UL (ref 0.8–5.3)
LYMPHOCYTES NFR BLD AUTO: 19.1 %L (ref 20–48)
MCH RBC QN AUTO: 27.7 PG (ref 26.5–35)
MCHC RBC AUTO-ENTMCNC: 31 G/DL (ref 32–36)
MCV RBC AUTO: 89.4 FL (ref 78–100)
MID #: 0.3 K/UL (ref 0–2.2)
MID %: 4 %M (ref 0–20)
NITRITE UR QL STRIP: NEGATIVE
PH UR STRIP: 6.5 [PH] (ref 5–7)
PLATELET # BLD AUTO: 137 K/UL (ref 150–450)
POTASSIUM SERPL-SCNC: 3.7 MMOL/DL (ref 3.2–4.6)
PROT SERPL-MCNC: 8 G/DL (ref 6.8–8.8)
PROTEIN UR: NEGATIVE
RBC # BLD AUTO: 5.8 M/UL (ref 4.4–5.9)
SODIUM SERPL-SCNC: 138.4 MMOL/L (ref 132–142)
SP GR UR STRIP: <=1.005
UROBILINOGEN UR STRIP-ACNC: NORMAL
WBC # BLD AUTO: 7.8 K/UL (ref 4–11)

## 2017-09-25 RX ORDER — OMEPRAZOLE 40 MG/1
40 CAPSULE, DELAYED RELEASE ORAL DAILY
Qty: 30 CAPSULE | Refills: 1 | Status: SHIPPED | OUTPATIENT
Start: 2017-09-25

## 2017-09-25 NOTE — MR AVS SNAPSHOT
After Visit Summary   9/25/2017    Marc Maria    MRN: [id number]           Patient Information     Date Of Birth          1990        Visit Information        Provider Department      9/25/2017 1:30 PM Dania Baker MD [facility]        Today's Diagnoses     Abdominal pain, epigastric    -  1      Care Instructions    1. Abdominal pain, epigastric  Stop by the lab  - CBC with Diff Plt ([facility])  - Comprehensive Metabolic Panel ([place])  - Lipase ([facility])  - Urinalysis ([facility])  - H. Pylori Agn Fecal ([facility]); Future  Schedule US  - US ABDOMEN COMPLETE; Future  Try this every day  - omeprazole (PRILOSEC) 40 MG capsule; Take 1 capsule (40 mg) by mouth daily Take 30-60 minutes before a meal.  Dispense: 30 capsule; Refill: 1    See us in 2 weeks or sooner if needed. Go to ED for severe abdominal pain, vomiting that doesn't stop, etc.          Follow-ups after your visit        Future tests that were ordered for you today     Open Future Orders        Priority Expected Expires Ordered    US ABDOMEN COMPLETE Routine  9/25/2018 9/25/2017    H. Pylori Agn Fecal ([facility]) Routine  10/2/2017 9/25/2017            Who to contact     Please call your clinic at [phone] to:    Ask questions about your health    Make or cancel appointments    Discuss your medicines    Learn about your test results    Speak to your doctor   If you have compliments or concerns about an experience at your clinic, or if you wish to file a complaint, please contact [facility] Patient Relations at [phone] or email us at [email or web address]         Additional Information About Your Visit        Care EveryWhere ID     This is your Care EveryWhere ID. This could be used by other organizations to access your [place] medical records  [id number]        Your Vitals Were     Pulse Temperature Height Pulse Oximetry BMI (Body Mass Index)       84 98.2  F (36.8  C)  "(Oral) 5' 3.78\" (162 cm) 99% 18.88 kg/m2        Blood Pressure from Last 3 Encounters:   09/25/17 138/88   06/20/17 129/82   06/09/17 129/83    Weight from Last 3 Encounters:   09/25/17 109 lb 4 oz (49.6 kg)   06/20/17 111 lb 3.2 oz (50.4 kg)   06/09/17 112 lb (50.8 kg)              We Performed the Following     CBC with Diff Plt ([facility])     Comprehensive Metabolic Panel ([place])     Lipase ([facility])     Urinalysis ([facility])          Today's Medication Changes          These changes are accurate as of: 9/25/17  2:09 PM.  If you have any questions, ask your nurse or doctor.               Start taking these medicines.        Dose/Directions    omeprazole 40 MG capsule   Commonly known as:  priLOSEC   Used for:  Abdominal pain, epigastric   Started by:  Dania Baker MD        Dose:  40 mg   Take 1 capsule (40 mg) by mouth daily Take 30-60 minutes before a meal.   Quantity:  30 capsule   Refills:  1            Where to get your medicines      These medications were sent to [facility] - [place], [place] - [address], [place] [address]     Phone:  [phone]     omeprazole 40 MG capsule                Primary Care Provider Office Phone # Fax #    [place] Judd Patel [phone]       [facility] [address] [place] [address]        Equal Access to Services     CARLOS CHRISTENSEN AH: Aramis Gomez, waaxda luqadaha, qaybta kaalmada adeananyada, gino keys. So [facility] [phone].    ATENCIÓN: Si habla español, tiene a staley disposición servicios gratuitos de asistencia lingüística. Llame al [phone].    We comply with applicable federal civil rights laws and [place] laws. We do not discriminate on the basis of race, color, national origin, age, disability sex, sexual orientation or gender identity.            Thank you!     Thank you for choosing [facility]  for your care. Our goal is always to " provide you with excellent care. Hearing back from our patients is one way we can continue to improve our services. Please take a few minutes to complete the written survey that you may receive in the mail after your visit with us. Thank you!             Your Updated Medication List - Protect others around you: Learn how to safely use, store and throw away your medicines at www.disposemymeds.org.          This list is accurate as of: 9/25/17  2:09 PM.  Always use your most recent med list.                   Brand Name Dispense Instructions for use Diagnosis    omeprazole 40 MG capsule    priLOSEC    30 capsule    Take 1 capsule (40 mg) by mouth daily Take 30-60 minutes before a meal.    Abdominal pain, epigastric

## 2017-09-25 NOTE — PROGRESS NOTES
Preceptor attestation:  Patient seen and discussed with the resident. Assessment and plan reviewed with resident and agreed upon.  Supervising physician: Jose Chanel  [facility]

## 2017-09-25 NOTE — NURSING NOTE
May we mail normal lab results to your home?     Patient's preferred contact method if labs are abnormal    name: Annad Humphrey  Language: Camelia  Agency: [employer]  Phone number: [phone]       name: Iveth Dawson  Language: Camelia  Agency: [employer]  Phone number: [phone]

## 2017-09-25 NOTE — PROGRESS NOTES
ASSESSMENT AND PLAN     1. Abdominal pain, epigastric  Ongoing abdominal pain that radiates to back and poor appetite x a few months despite tx for H pylori in June (patient reports compliance). Normal LFTs and non-pathogenic stool organisms noted on new refugee screen in Dec 2016. +nausea and 1 episode emesis, no diarrhea, constipation, or significant weight loss. Chew betel nut, denies smoking/alcohol use. Differential includes resistant/untreated H pylori, pancreatitis, cholecystitis, GERD, PUD.   - CBC with Diff Plt ([facility])  - Comprehensive Metabolic Panel ([place])  - Lipase ([facility])  - Urinalysis ([facility])  - H. Pylori Agn Fecal ([facility]); Future  - US ABDOMEN COMPLETE; Future  - omeprazole (PRILOSEC) 40 MG capsule; Take 1 capsule (40 mg) by mouth daily Take 30-60 minutes before a meal.  Dispense: 30 capsule; Refill: 1    See us in 2 weeks or sooner if needed. Go to ED for severe abdominal pain, vomiting that doesn't stop, etc.    The patient was seen by, and discussed with, Dr. Zavala who agrees with the plan.    Dania Baker MD  PGY-2  Pager # [phone]         SUBJECTIVE       Marc Maribel Maria is a 27 year old  male with a PMH significant for:     Patient Active Problem List   Diagnosis     Health examination of defined subpopulation     TB (pulmonary tuberculosis)     Hepatitis B carrier (H)     Schistosoma     He presents with abdominal pain.    Burning pain in epigastrum that goes into the back. Same pain as previous visits at this clinic, see notes for details. Pain never went away, despite treatment for H pylori in June 2017 (states he was compliant). Nothing seemed to helped. Pain improves with eating. Pain is more intense if he doesn't eat. Comes and goes. Wakes him up from sleep. Nausea in the AM sometimes. Vomited last week, only time in a month. No diarrhea or constipation. No tarry stools, bloody stools. Non-smoker but chews beetle nut. No alcohol use. No medications currently.  "No travel. No dysuria, hematuria.    PMH, Medications and Allergies were reviewed and updated as needed.        REVIEW OF SYSTEMS     CONSTITUTIONAL: as per HPI  GI: as per HPI  : as per HPI        OBJECTIVE     Vitals:    09/25/17 1347   BP: 138/88   Pulse: 84   Temp: 98.2  F (36.8  C)   TempSrc: Oral   SpO2: 99%   Weight: 109 lb 4 oz (49.6 kg)   Height: 5' 3.78\" (162 cm)     Body mass index is 18.88 kg/(m^2).    Constitutional: Awake, alert, cooperative, no apparent distress, and appears stated age.  Lungs: No increased work of breathing, good air exchange, clear to auscultation bilaterally, no crackles or wheezing.  Cardiovascular: Regular rate and rhythm, normal S1 and S2, no S3 or S4, and no murmur noted.  Abdomen: Thin, normal bowel sounds, soft, non-distended, non-tender in all quadrants, no masses palpated, negative Jerry's, negative carnett's.  Neuropsychiatric: Euthymic    No results found for this or any previous visit (from the past 24 hour(s)).      "

## 2017-09-25 NOTE — PATIENT INSTRUCTIONS
1. Abdominal pain, epigastric  Stop by the lab  - CBC with Diff Plt ([facility])  - Comprehensive Metabolic Panel ([place])  - Lipase ([facility])  - Urinalysis ([facility])  - H. Pylori Agn Fecal ([facility]); Future  Schedule US  - US ABDOMEN COMPLETE; Future  Try this every day  - omeprazole (PRILOSEC) 40 MG capsule; Take 1 capsule (40 mg) by mouth daily Take 30-60 minutes before a meal.  Dispense: 30 capsule; Refill: 1    See us in 2 weeks or sooner if needed. Go to ED for severe abdominal pain, vomiting that doesn't stop, etc.    You are schedule for Ultrasound at:  [facility]  Radiology Department  [address] [place] [address]  [phone]  Date:  Wednesday September 27, 2017  Time:  8:45 AM  Nothing to eat or drink 8 hours prior to appointment.   has been requested for this appointment.  [facility] [employer] will pick you up at 7:45 AM. [place]  [phone]  Given to patient.  9/25/17  Asuncion Murphy

## 2017-09-25 NOTE — LETTER
September 28, 2017      [facility]  [address] [place] [address]        Dear Marc,    Please see below for your test results. Your recent labs show the following: you do not have a bladder infection. Your pancreas, liver and kidney numbers are normal. Your red and white blood cell numbers are normal. Your platelets (help blood clot) are slightly low. This would not explain your symptoms but is something that should be followed up on in the next few months with a recheck. If you notice worsening bleeding or bruising sooner, please let us know. Please also complete the belly ultrasound and remember to return your stool test.     Resulted Orders   CBC with Diff Plt ([facility])   Result Value Ref Range    WBC 7.8 4.0 - 11.0 K/uL    Lymphocytes # 1.5 0.8 - 5.3 K/uL    % Lymphocytes 19.1 (L) 20.0 - 48.0 %L    Mid # 0.3 0.0 - 2.2 K/uL    Mid % 4.0 0.0 - 20.0 %M    GRANULOCYTES # 6.0 1.6 - 8.3 K/uL    % Granulocytes 76.9 (H) 40.0 - 75.0 %G    RBC 5.8 4.4 - 5.9 M/uL    Hemoglobin 16.2 13.3 - 17.7 g/dL    Hematocrit 52.2 40.0 - 53.0 %    MCV 89.4 78.0 - 100.0 fL    MCH 27.7 26.5 - 35.0    MCHC 31.0 (L) 32.0 - 36.0 g/dL    Platelets 137.0 (L) 150.0 - 450.0 K/uL   Comprehensive Metabolic Panel ([facility])   Result Value Ref Range    Albumin 4.7 3.9 - 5.1 mg/dL    Alkaline Phosphatase 60.2 40.0 - 150.0 U/L    ALT 25.0 0.0 - 45.0 U/L    AST 28.6 0.0 - 55.0 U/L    Bilirubin Total 0.7 0.2 - 1.3 mg/dL    Urea Nitrogen 9.9 7.0 - 21.0 mg/dL    Calcium 10.3 (H) 8.5 - 10.1 mg/dL    Chloride 100.6 98.0 - 110.0 mmol/L    Carbon Dioxide 28.6 20.0 - 32.0 mmol/L    Creatinine 0.9 0.7 - 1.3 mg/dL    Glucose 131.5 (H) 70.0 - 99.0 mg'dL    Potassium 3.7 3.2 - 4.6 mmol/dL    Sodium 138.4 132.0 - 142.0 mmol/L    Protein Total 8.0 6.8 - 8.8 g/dL    GFR Estimate >90 >60.0 mL/min/1.7 m2    GFR Estimate If Black >90 >60.0 mL/min/1.7 m2   Lipase ([facility])   Result Value Ref Range    Lipase 15 0 - 52 U/L    Narrative    Test performed  by:  [facility]  [address] [place] [address]   Urinalysis (P )   Result Value Ref Range    Specific Gravity Urine <=1.005 1.005 - 1.030    pH Urine 6.5 4.5 - 8.0    Leukocyte Esterase UR Negative NEGATIVE    Nitrite Urine Negative NEGATIVE    Protein UR Negative NEGATIVE    Glucose Urine Negative NEGATIVE    Ketones Urine Negative NEGATIVE    Urobilinogen mg/dL 0.2 E.U./dL 0.2 E.U./dL    Bilirubin UR Negative NEGATIVE    Blood UR Negative NEGATIVE       If you have any questions, please call the clinic to make an appointment.    Sincerely,    Dania Baker MD

## 2017-09-26 DIAGNOSIS — R10.13 ABDOMINAL PAIN, EPIGASTRIC: ICD-10-CM

## 2017-09-27 LAB — H PYLORI AG STL QL IA: NEGATIVE

## 2017-09-27 NOTE — PROGRESS NOTES
Please have patient's  call and relay the following:       name: Anand Humphrey  Language: Camelia  Agency: [employer]  Phone number: [phone]    Dear Marc Maria,    Your recent labs show the following: you do not have a bladder infection. Your pancreas, liver and kidney numbers are normal. Your red and white blood cell numbers are normal. Your platelets (help blood clot) are slightly low. This would not explain your symptoms but is something that should be followed up on in the next few months with a recheck. If you notice worsening bleeding or bruising sooner, please let us know. Please also complete the belly ultrasound and remember to return your stool test.     Please call with any questions.  Dr. Baker

## 2017-10-16 ENCOUNTER — OFFICE VISIT (OUTPATIENT)
Dept: FAMILY MEDICINE | Facility: CLINIC | Age: 27
End: 2017-10-16

## 2017-10-16 VITALS
HEART RATE: 90 BPM | WEIGHT: 108 LBS | TEMPERATURE: 98.3 F | DIASTOLIC BLOOD PRESSURE: 77 MMHG | SYSTOLIC BLOOD PRESSURE: 115 MMHG | BODY MASS INDEX: 18.67 KG/M2 | OXYGEN SATURATION: 99 %

## 2017-10-16 DIAGNOSIS — K21.9 GASTROESOPHAGEAL REFLUX DISEASE, ESOPHAGITIS PRESENCE NOT SPECIFIED: ICD-10-CM

## 2017-10-16 DIAGNOSIS — F17.229 CHEWING TOBACCO NICOTINE DEPENDENCE WITH NICOTINE-INDUCED DISORDER: Primary | ICD-10-CM

## 2017-10-16 DIAGNOSIS — H11.001 PTERYGIUM OF RIGHT EYE: ICD-10-CM

## 2017-10-16 NOTE — PROGRESS NOTES
This is a 27-year-old male who returns mainly because he was told one of his lab tests was abnormal. Review of the records indicate that he had a mildly reduced platelet count. Symptomatically he says he has no symptoms and feels very well right now. He reports that his previously described upper abdominal pain radiating to his back has fully resolved while taking omeprazole. We discussed his diet and he doesn't have a particularly spicy diet and he tends to avoid greasy foods. He does chew tobacco and we did discuss that this would be a risk factor for GERD. He is potentially interested in trying nicotine chewing gum to get off chewing tobacco. He doesn't drink much alcohol. He does drink some coffee.    Objective:  /77  Pulse 90  Temp 98.3  F (36.8  C)  Wt 108 lb (49 kg)  SpO2 99%  BMI 18.67 kg/m2  His vitals are stable and indeed his weight is just below recommended. We reviewed the lab results with him through the .  On exam he does have a developing pterygium of the right eye.  His abdomen is completely benign without any tenderness guarding or rigidity.    Marc was seen today for results.    Diagnoses and all orders for this visit:    Chewing tobacco nicotine dependence with nicotine-induced disorder  -     nicotine polacrilex (NICORETTE) 2 MG gum; Place 1 each (2 mg) inside cheek as needed for smoking cessation    Gastroesophageal reflux disease, esophagitis presence not specified  -     ranitidine (ZANTAC) 150 MG tablet; Take 1 tablet (150 mg) by mouth 2 times daily as needed for heartburn    Pterygium of right eye     I suggested that his platelet count could be repeated in 6 months time and generally reassured him that given the absence of any symptoms today this is not necessary to recheck today. In addition he had been referred for an ultrasound of abdomen but given that he is completely asymptomatic he agrees with postponing this for now.    He is agreeable to trying nicotine  chewing gum to quit his chewing tobacco habit.    I did refill ranitidine for when he is finished with omeprazole. We advised that he could use that p.r.n. We have also given him some patient information particularly in relation to dietary changes to minimize his acid reflux symptoms.    Total visit time was 25 mins, all of which was face to face MD time, and over 50% of this time was spent in counseling and coordination of care.

## 2017-10-16 NOTE — MR AVS SNAPSHOT
After Visit Summary   10/16/2017    Marc Maria    MRN: [id number]           Patient Information     Date Of Birth          1990        Visit Information        Provider Department      10/16/2017 9:20 AM Jake Syed MD [facility]        Today's Diagnoses     Chewing tobacco nicotine dependence with nicotine-induced disorder    -  1    Gastroesophageal reflux disease, esophagitis presence not specified          Care Instructions    1. Glad you are feeling good today!  2. Finish taking Omeprazole  3. You can get the Ranitidine and use that when you need it for the stomach problem in the future.  4. You do not need to do the ultrasound  5. We will re-check your blood work in about 4-5 months to see about the low platelet level (blood number)    Thanks for coming in today!        Lifestyle Changes for Controlling GERD  When you have GERD, stomach acid feels as if it s backing up toward your mouth. Whether or not you take medicine to control your GERD, your symptoms can often be improved with lifestyle changes. Talk to your healthcare provider about the following suggestions. These suggestions may help you get relief from your symptoms.      Raise your head  Reflux is more likely to strike when you re lying down flat, because stomach fluid can flow backward more easily. Raising the head of your bed 4 to 6 inches can help. To do this:    Slide blocks or books under the legs at the head of your bed. Or, place a wedge under the mattress. [employer] can make a suitable wedge for you. The wedge should run from your waist to the top of your head.    Don t just prop your head on several pillows. This increases pressure on your stomach. It can make GERD worse.  Watch your eating habits  Certain foods may increase the acid in your stomach or relax the lower esophageal sphincter. This makes GERD more likely. It s best to avoid the following if they cause you symptoms:    Coffee, tea, and  carbonated drinks (with and without caffeine)    Fatty, fried, or spicy food    Mint, chocolate, onions, and tomatoes    Peppermint    Any other foods that seem to irritate your stomach or cause you pain  Relieve the pressure  Tips include the following:    Eat smaller meals, even if you have to eat more often.    Don t lie down right after you eat. Wait a few hours for your stomach to empty.    Avoid tight belts and tight-fitting clothes.    Lose excess weight.  Tobacco and alcohol  Avoid smoking tobacco and drinking alcohol. They can make GERD symptoms worse.  Date Last Reviewed: 7/1/2016 2000-2017 [employer] [address] [place] [address]. All rights reserved. This information is not intended as a substitute for professional medical care. Always follow your healthcare professional's instructions.                Follow-ups after your visit        Follow-up notes from your care team     Return if symptoms worsen or fail to improve.      Who to contact     Please call your clinic at [phone] to:    Ask questions about your health    Make or cancel appointments    Discuss your medicines    Learn about your test results    Speak to your doctor   If you have compliments or concerns about an experience at your clinic, or if you wish to file a complaint, please contact [facility] Patient Relations at [phone] or email us at [email or web address]         Additional Information About Your Visit        Care EveryWhere ID     This is your Care EveryWhere ID. This could be used by other organizations to access your [place] medical records  [id number]        Your Vitals Were     Pulse Temperature Pulse Oximetry BMI (Body Mass Index)          90 98.3  F (36.8  C) 99% 18.67 kg/m2         Blood Pressure from Last 3 Encounters:   10/16/17 115/77   09/25/17 138/88   06/20/17 129/82    Weight from Last 3 Encounters:   10/16/17 108 lb (49 kg)   09/25/17 109  lb 4 oz (49.6 kg)   06/20/17 111 lb 3.2 oz (50.4 kg)              Today, you had the following     No orders found for display         Today's Medication Changes          These changes are accurate as of: 10/16/17 10:27 AM.  If you have any questions, ask your nurse or doctor.               Start taking these medicines.        Dose/Directions    nicotine polacrilex 2 MG gum   Commonly known as:  NICORETTE   Used for:  Chewing tobacco nicotine dependence with nicotine-induced disorder   Started by:  Jake Syed MD        Dose:  2 mg   Place 1 each (2 mg) inside cheek as needed for smoking cessation   Quantity:  40 tablet   Refills:  3       ranitidine 150 MG tablet   Commonly known as:  ZANTAC   Used for:  Gastroesophageal reflux disease, esophagitis presence not specified   Started by:  Jake Syed MD        Dose:  150 mg   Start taking on:  11/16/2017   Take 1 tablet (150 mg) by mouth 2 times daily as needed for heartburn   Quantity:  60 tablet   Refills:  11            Where to get your medicines      These medications were sent to [facility] - [place], [place] - [address], [place] [address]     Phone:  [phone]     nicotine polacrilex 2 MG gum    ranitidine 150 MG tablet                Primary Care Provider Office Phone # Fax #    Cachorro Judd Patel [phone]       [facility] [address] [place] [address]        Equal Access to Services     CARLOS CHRISTENSEN AH: Aramis colvin hadasho Sorola, waaxda luqadaha, qaybta kaalmada adeannayada, gino keys. So [facility] [phone].    ATENCIÓN: Si habla español, tiene a staley disposición servicios gratuitos de asistencia lingüística. Jonah al [phone].    We comply with applicable federal civil rights laws and [place] laws. We do not discriminate on the basis of race, color, national origin, age, disability, sex, sexual orientation, or gender identity.            Thank  you!     Thank you for choosing [facility]  for your care. Our goal is always to provide you with excellent care. Hearing back from our patients is one way we can continue to improve our services. Please take a few minutes to complete the written survey that you may receive in the mail after your visit with us. Thank you!             Your Updated Medication List - Protect others around you: Learn how to safely use, store and throw away your medicines at www.disposemymeds.org.          This list is accurate as of: 10/16/17 10:27 AM.  Always use your most recent med list.                   Brand Name Dispense Instructions for use Diagnosis    nicotine polacrilex 2 MG gum    NICORETTE    40 tablet    Place 1 each (2 mg) inside cheek as needed for smoking cessation    Chewing tobacco nicotine dependence with nicotine-induced disorder       omeprazole 40 MG capsule    priLOSEC    30 capsule    Take 1 capsule (40 mg) by mouth daily Take 30-60 minutes before a meal.    Abdominal pain, epigastric       ranitidine 150 MG tablet   Start taking on:  11/16/2017    ZANTAC    60 tablet    Take 1 tablet (150 mg) by mouth 2 times daily as needed for heartburn    Gastroesophageal reflux disease, esophagitis presence not specified

## 2017-10-16 NOTE — NURSING NOTE
name: Anand Humphrey  Language: Camelia  Agency: [facility]  Phone number: [phone]

## 2017-10-16 NOTE — PATIENT INSTRUCTIONS
1. Glad you are feeling good today!  2. Finish taking Omeprazole  3. You can get the Ranitidine and use that when you need it for the stomach problem in the future.  4. You do not need to do the ultrasound  5. We will re-check your blood work in about 4-5 months to see about the low platelet level (blood number)    Thanks for coming in today!        Lifestyle Changes for Controlling GERD  When you have GERD, stomach acid feels as if it s backing up toward your mouth. Whether or not you take medicine to control your GERD, your symptoms can often be improved with lifestyle changes. Talk to your healthcare provider about the following suggestions. These suggestions may help you get relief from your symptoms.      Raise your head  Reflux is more likely to strike when you re lying down flat, because stomach fluid can flow backward more easily. Raising the head of your bed 4 to 6 inches can help. To do this:    Slide blocks or books under the legs at the head of your bed. Or, place a wedge under the mattress. [employer] can make a suitable wedge for you. The wedge should run from your waist to the top of your head.    Don t just prop your head on several pillows. This increases pressure on your stomach. It can make GERD worse.  Watch your eating habits  Certain foods may increase the acid in your stomach or relax the lower esophageal sphincter. This makes GERD more likely. It s best to avoid the following if they cause you symptoms:    Coffee, tea, and carbonated drinks (with and without caffeine)    Fatty, fried, or spicy food    Mint, chocolate, onions, and tomatoes    Peppermint    Any other foods that seem to irritate your stomach or cause you pain  Relieve the pressure  Tips include the following:    Eat smaller meals, even if you have to eat more often.    Don t lie down right after you eat. Wait a few hours for your stomach to empty.    Avoid tight belts and tight-fitting clothes.    Lose excess  weight.  Tobacco and alcohol  Avoid smoking tobacco and drinking alcohol. They can make GERD symptoms worse.  Date Last Reviewed: 7/1/2016 2000-2017 [employer]. [address] [place] [address], [place] [address]. All rights reserved. This information is not intended as a substitute for professional medical care. Always follow your healthcare professional's instructions.

## 2020-02-26 ENCOUNTER — OFFICE VISIT (OUTPATIENT)
Dept: FAMILY MEDICINE | Facility: CLINIC | Age: 30
End: 2020-02-26
Payer: COMMERCIAL

## 2020-02-26 VITALS
OXYGEN SATURATION: 99 % | BODY MASS INDEX: 18.71 KG/M2 | TEMPERATURE: 97.7 F | DIASTOLIC BLOOD PRESSURE: 77 MMHG | WEIGHT: 109.6 LBS | HEIGHT: 64 IN | HEART RATE: 78 BPM | RESPIRATION RATE: 20 BRPM | SYSTOLIC BLOOD PRESSURE: 130 MMHG

## 2020-02-26 DIAGNOSIS — K59.00 CONSTIPATION, UNSPECIFIED CONSTIPATION TYPE: ICD-10-CM

## 2020-02-26 DIAGNOSIS — R05.9 COUGH: Primary | ICD-10-CM

## 2020-02-26 DIAGNOSIS — R09.89 RUNNY NOSE: ICD-10-CM

## 2020-02-26 DIAGNOSIS — R53.81 MALAISE: ICD-10-CM

## 2020-02-26 RX ORDER — IBUPROFEN 600 MG/1
600 TABLET, FILM COATED ORAL EVERY 6 HOURS PRN
Qty: 60 TABLET | Refills: 0 | Status: SHIPPED | OUTPATIENT
Start: 2020-02-26

## 2020-02-26 RX ORDER — DEXTROMETHORPHAN HBR. AND GUAIFENESIN 10; 100 MG/5ML; MG/5ML
5 SOLUTION ORAL 4 TIMES DAILY PRN
Qty: 237 ML | Refills: 1 | Status: SHIPPED | OUTPATIENT
Start: 2020-02-26

## 2020-02-26 RX ORDER — POLYETHYLENE GLYCOL 3350 17 G/17G
1 POWDER, FOR SOLUTION ORAL DAILY
Qty: 1 BOTTLE | Refills: 3 | Status: SHIPPED | OUTPATIENT
Start: 2020-02-26

## 2020-02-26 ASSESSMENT — MIFFLIN-ST. JEOR: SCORE: 1382.76

## 2020-02-26 NOTE — NURSING NOTE
Due to patient being non-English speaking/uses sign language, an  was used for this visit. Only for face-to-face interpretation by an external agency, date and length of interpretation can be found on the scanned worksheet.       name:  Jose Kinney  Agency: [employer]  Language: Camelia  Telephone number:   [phone]  Type of interpretation:  Face-to-face, Spoken

## 2020-02-26 NOTE — PROGRESS NOTES
"       SUBJECTIVE       Marc Maribel Maria is a 30 year old  male with a PMH significant for:     Patient Active Problem List   Diagnosis     Health examination of defined subpopulation     TB (pulmonary tuberculosis)     Hepatitis B carrier (H)     Schistosoma     Chewing tobacco nicotine dependence with nicotine-induced disorder     Pterygium of right eye     Gastroesophageal reflux disease, esophagitis presence not specified     Patient has a 1 week history of cough, runny nose, and headache. The cough is mildly productive. He does not some constipation for a long time, he does use medications for this. He has not used any medication for this.     PMH, Medications and Allergies were reviewed and updated as needed.    ROS: No fever, chills, cough, runny nose, decreased appetite, decreased fluid intake, decreased urination, diarrhea, constipation, nausea, vomiting, headache, fatigue, sore throat, body aches, swollen/painful lymph nodes        OBJECTIVE     Vitals:    02/26/20 1346   BP: 130/77   BP Location: Left arm   Patient Position: Sitting   Cuff Size: Adult Regular   Pulse: 78   Resp: 20   Temp: 97.7  F (36.5  C)   TempSrc: Oral   SpO2: 99%   Weight: 49.7 kg (109 lb 9.6 oz)   Height: 1.633 m (5' 4.29\")     Body mass index is 18.64 kg/m .    General :  healthy and alert, no distress  HEENT:  PERRLA, MMM, no eye or nasal discharge, mild erythema of the posterior oropharynx, no tonsillar hypertrophy, erythema, or exudate, no nasal turbinate hypertrophy or erythema, TMs are grey and without bulging or retraction, normal conjunctiva.   Cardiovascular: regular rate and rhythm, normal S1/S2 no other heart sounds. Normal capillary refill  Respiratory:  CTA, normal respiratory effort  Musculoskeletal: no edema  Skin:   no lesions or rashes on exposed skin  Neurological:  normal gait                   Hematological: no cervical or submandibular lymphadenopathy     No results found for this or any previous visit (from the " past 24 hour(s)).    ASSESSMENT AND PLAN       Marc was seen today for headache.    Diagnoses and all orders for this visit:    Cough  -     dextromethorphan-guaiFENesin (TUSSIN DM)  MG/5ML liquid; Take 5 mLs by mouth 4 times daily as needed (cough)    Malaise  -     ibuprofen (ADVIL/MOTRIN) 600 MG tablet; Take 1 tablet (600 mg) by mouth every 6 hours as needed for moderate pain    Runny nose    Constipation, unspecified constipation type  -     polyethylene glycol (MIRALAX) powder; Take 17 g (1 capful) by mouth daily        Follow-up as needed. Return precautions given.    Discussed with MD Cachorro Vernon MD PGY 2  [facility]

## 2020-02-26 NOTE — PROGRESS NOTES
Preceptor Attestation:   Patient seen, evaluated and discussed with the resident. I have verified the content of the note, which accurately reflects my assessment of the patient and the plan of care.   Supervising Physician:  Teerll Abebe MD.

## 2022-02-17 PROBLEM — K21.9 GASTROESOPHAGEAL REFLUX DISEASE: Status: ACTIVE | Noted: 2017-10-16

## 2023-03-21 NOTE — PROGRESS NOTES
Please have patient's  call and relay the following:    Iveth Dawson  Language: Camelia  Agency: [employer]  Phone number: [phone]    Dear Mr. Maria,  Your stool test is negative for the H pylori infection, meaning the antibiotics you took in June treated your infection and this is not likely a cause of your belly pain. Please do have your ultrasound done and I will let you know regarding the results.     Please call with any questions.  Dr. Baker Yes